# Patient Record
Sex: MALE | Race: WHITE | Employment: STUDENT
[De-identification: names, ages, dates, MRNs, and addresses within clinical notes are randomized per-mention and may not be internally consistent; named-entity substitution may affect disease eponyms.]

---

## 2024-01-11 ENCOUNTER — OFFICE VISIT (OUTPATIENT)
Facility: CLINIC | Age: 24
End: 2024-01-11

## 2024-01-11 VITALS
DIASTOLIC BLOOD PRESSURE: 62 MMHG | BODY MASS INDEX: 22.18 KG/M2 | SYSTOLIC BLOOD PRESSURE: 116 MMHG | OXYGEN SATURATION: 97 % | RESPIRATION RATE: 18 BRPM | HEIGHT: 66 IN | HEART RATE: 82 BPM | WEIGHT: 138 LBS | TEMPERATURE: 97.6 F

## 2024-01-11 DIAGNOSIS — Z76.89 ENCOUNTER TO ESTABLISH CARE: ICD-10-CM

## 2024-01-11 DIAGNOSIS — Z13.220 SCREENING CHOLESTEROL LEVEL: ICD-10-CM

## 2024-01-11 DIAGNOSIS — Q21.0 VSD (VENTRICULAR SEPTAL DEFECT): ICD-10-CM

## 2024-01-11 DIAGNOSIS — R01.1 HEART MURMUR: Primary | ICD-10-CM

## 2024-01-11 DIAGNOSIS — R53.83 FATIGUE, UNSPECIFIED TYPE: ICD-10-CM

## 2024-01-11 DIAGNOSIS — Z11.59 NEED FOR HEPATITIS C SCREENING TEST: ICD-10-CM

## 2024-01-11 DIAGNOSIS — Z11.3 SCREENING EXAMINATION FOR STD (SEXUALLY TRANSMITTED DISEASE): ICD-10-CM

## 2024-01-11 PROCEDURE — 99204 OFFICE O/P NEW MOD 45 MIN: CPT | Performed by: FAMILY MEDICINE

## 2024-01-11 SDOH — ECONOMIC STABILITY: HOUSING INSECURITY
IN THE LAST 12 MONTHS, WAS THERE A TIME WHEN YOU DID NOT HAVE A STEADY PLACE TO SLEEP OR SLEPT IN A SHELTER (INCLUDING NOW)?: NO

## 2024-01-11 SDOH — ECONOMIC STABILITY: FOOD INSECURITY: WITHIN THE PAST 12 MONTHS, YOU WORRIED THAT YOUR FOOD WOULD RUN OUT BEFORE YOU GOT MONEY TO BUY MORE.: NEVER TRUE

## 2024-01-11 SDOH — ECONOMIC STABILITY: INCOME INSECURITY: HOW HARD IS IT FOR YOU TO PAY FOR THE VERY BASICS LIKE FOOD, HOUSING, MEDICAL CARE, AND HEATING?: NOT HARD AT ALL

## 2024-01-11 SDOH — ECONOMIC STABILITY: FOOD INSECURITY: WITHIN THE PAST 12 MONTHS, THE FOOD YOU BOUGHT JUST DIDN'T LAST AND YOU DIDN'T HAVE MONEY TO GET MORE.: NEVER TRUE

## 2024-01-11 ASSESSMENT — PATIENT HEALTH QUESTIONNAIRE - PHQ9
SUM OF ALL RESPONSES TO PHQ QUESTIONS 1-9: 0
2. FEELING DOWN, DEPRESSED OR HOPELESS: 0
SUM OF ALL RESPONSES TO PHQ QUESTIONS 1-9: 0
SUM OF ALL RESPONSES TO PHQ9 QUESTIONS 1 & 2: 0
1. LITTLE INTEREST OR PLEASURE IN DOING THINGS: 0

## 2024-01-11 NOTE — PROGRESS NOTES
Subjective  Chief Complaint   Patient presents with    [facility]    Cardiology referral     HPI:  Terrell Neumann is a 23 y.o. male with medical problems as listed below who presents to establish care. He is also requesting a referral to Cardiology.      Past medical history: History of what sounds like VSD. Saw Cardiology who said it was nothing to worry about.   Medications: None  Allergies: Has allergy to some medication but cannot recall name.   Specialists: Cardiology in the past  Surgical history: None  Family history: MI in his 40's, CAD (dad).   Social history: No tobacco, occasional alcohol, no drugs. Works out 3-4 times per week (resistance training). In college, about to go to [place] in February-May. Studying marketing.     Hx of VSD: Went to [facility] around 16 years old. Patient was told that he needs to see Cardiology once a year.     There is no problem list on file for this patient.    Family History   Problem Relation Age of Onset    Heart Attack Father       Social History     Tobacco Use    Smoking status: Never    Smokeless tobacco: Never   Vaping Use    Vaping Use: Never used   Substance Use Topics    Drug use: Not Currently     No current outpatient medications on file prior to visit.     No current facility-administered medications on file prior to visit.     Allergies   Allergen Reactions    Milk-Related Compounds      Review of Systems   Constitutional: Negative.    Respiratory: Negative.     Cardiovascular: Negative.          Objective  Vitals:    01/11/24 1355   BP: 116/62   Pulse: 82   Resp: 18   Temp: 97.6 °F (36.4 °C)   SpO2: 97%     Physical Exam  Constitutional:       General: He is not in acute distress.     Appearance: Normal appearance. He is not ill-appearing.   HENT:      Head: Normocephalic and atraumatic.   Eyes:      Extraocular Movements: Extraocular movements intact.      Conjunctiva/sclera: Conjunctivae normal.   Pulmonary:      Effort: Pulmonary effort is normal. No

## 2024-01-11 NOTE — PROGRESS NOTES
1. Have you been to the ER, urgent care clinic since your last visit?  Hospitalized since your last visit?No    2. Have you seen or consulted any other health care providers outside of the [facility] since your last visit?  Include any pap smears or colon screening. No    Chief Complaint   Patient presents with    Establish Care    Cardiology referral     /62 (Site: Right Upper Arm, Position: Sitting, Cuff Size: Large Adult)   Pulse 82   Temp 97.6 °F (36.4 °C) (Temporal)   Resp 18   Ht 1.676 m (5' 6\")   Wt 62.6 kg (138 lb)   SpO2 97%   BMI 22.27 kg/m²